# Patient Record
Sex: MALE | Race: WHITE | Employment: FULL TIME | URBAN - METROPOLITAN AREA
[De-identification: names, ages, dates, MRNs, and addresses within clinical notes are randomized per-mention and may not be internally consistent; named-entity substitution may affect disease eponyms.]

---

## 2021-08-15 ENCOUNTER — HOSPITAL ENCOUNTER (EMERGENCY)
Age: 26
Discharge: HOME | End: 2021-08-15
Attending: EMERGENCY MEDICINE
Payer: COMMERCIAL

## 2021-08-15 VITALS
WEIGHT: 140 LBS | HEART RATE: 74 BPM | OXYGEN SATURATION: 99 % | RESPIRATION RATE: 12 BRPM | HEIGHT: 69 IN | BODY MASS INDEX: 20.73 KG/M2 | DIASTOLIC BLOOD PRESSURE: 84 MMHG | SYSTOLIC BLOOD PRESSURE: 131 MMHG | TEMPERATURE: 98.2 F

## 2021-08-15 DIAGNOSIS — S81.812A LACERATION OF LEFT LOWER EXTREMITY, INITIAL ENCOUNTER: Primary | ICD-10-CM

## 2021-08-15 PROCEDURE — 99282 EMERGENCY DEPT VISIT SF MDM: CPT

## 2021-08-15 PROCEDURE — 2500000003 HC RX 250 WO HCPCS: Performed by: NURSE PRACTITIONER

## 2021-08-15 PROCEDURE — 6360000002 HC RX W HCPCS: Performed by: NURSE PRACTITIONER

## 2021-08-15 PROCEDURE — 90715 TDAP VACCINE 7 YRS/> IM: CPT | Performed by: NURSE PRACTITIONER

## 2021-08-15 PROCEDURE — 90471 IMMUNIZATION ADMIN: CPT | Performed by: NURSE PRACTITIONER

## 2021-08-15 PROCEDURE — 12002 RPR S/N/AX/GEN/TRNK2.6-7.5CM: CPT

## 2021-08-15 RX ORDER — CEPHALEXIN 500 MG/1
500 CAPSULE ORAL 2 TIMES DAILY
Qty: 10 CAPSULE | Refills: 0 | Status: SHIPPED | OUTPATIENT
Start: 2021-08-15

## 2021-08-15 RX ORDER — LIDOCAINE HYDROCHLORIDE 10 MG/ML
20 INJECTION, SOLUTION INFILTRATION; PERINEURAL ONCE
Status: COMPLETED | OUTPATIENT
Start: 2021-08-15 | End: 2021-08-15

## 2021-08-15 RX ADMIN — TETANUS TOXOID, REDUCED DIPHTHERIA TOXOID AND ACELLULAR PERTUSSIS VACCINE, ADSORBED 0.5 ML: 5; 2.5; 8; 8; 2.5 SUSPENSION INTRAMUSCULAR at 16:11

## 2021-08-15 RX ADMIN — LIDOCAINE HYDROCHLORIDE 20 ML: 10 INJECTION, SOLUTION INFILTRATION; PERINEURAL at 16:13

## 2021-08-15 ASSESSMENT — PAIN SCALES - GENERAL: PAINLEVEL_OUTOF10: 4

## 2021-08-15 NOTE — ED PROVIDER NOTES
eMERGENCY dEPARTMENT eNCOUnter   Independent Attestation     Pt Name: Glen Kraus  MRN: [id number]  [place] 1995  Date of evaluation: 8/15/21     Glen Kraus is a 22 y.o. male with CC: Laceration      Based on the medical record the care appears appropriate. I was personally available for consultation in the Emergency Department.     Berkley Alcocer DO  Attending Emergency Physician                    Reina Topete DO  08/15/21 [id number]

## 2021-08-16 ASSESSMENT — ENCOUNTER SYMPTOMS
SINUS PRESSURE: 0
ABDOMINAL PAIN: 0
COLOR CHANGE: 0
VOMITING: 0
RHINORRHEA: 0
WHEEZING: 0
SORE THROAT: 0
COUGH: 0
NAUSEA: 0
CONSTIPATION: 0
DIARRHEA: 0
SHORTNESS OF BREATH: 0

## 2021-08-16 NOTE — ED PROVIDER NOTES
wound. Negative for color change and rash. Neurological: Negative for dizziness, weakness and headaches. Hematological: Negative for adenopathy. All other systems reviewed and are negative. Except as noted above the remainder of the review of systems was reviewed and negative. PHYSICAL EXAM    (up to 7 for level 4, 8 or more for level 5)     ED Triage Vitals [08/15/21 1551]   BP Temp Temp Source Pulse Resp SpO2 Height Weight   131/84 98.2 °F (36.8 °C) Oral 74 12 99 % 5' 9\" (1.753 m) 140 lb (63.5 kg)       Physical Exam  Vitals reviewed. Constitutional:       Appearance: He is well-developed. HENT:      Head: Normocephalic and atraumatic. Eyes:      Conjunctiva/sclera: Conjunctivae normal.      Pupils: Pupils are equal, round, and reactive to light. Cardiovascular:      Rate and Rhythm: Normal rate and regular rhythm. Pulmonary:      Effort: Pulmonary effort is normal. No respiratory distress. Breath sounds: Normal breath sounds. No stridor. Abdominal:      General: Bowel sounds are normal.      Palpations: Abdomen is soft. Musculoskeletal:         General: Normal range of motion. Cervical back: Normal range of motion and neck supple. Lymphadenopathy:      Cervical: No cervical adenopathy. Skin:     General: Skin is warm and dry. Findings: No rash. Comments: 3 cm laceration noted to the left leg   Neurological:      Mental Status: He is alert and oriented to person, place, and time. LABS:  Labs Reviewed - No data to display    All other labs were within normal range or not returned as of this dictation.     EMERGENCY DEPARTMENT COURSE and DIFFERENTIAL DIAGNOSIS/MDM:   Vitals:    Vitals:    08/15/21 1551   BP: 131/84   Pulse: 74   Resp: 12   Temp: 98.2 °F (36.8 °C)   TempSrc: Oral   SpO2: 99%   Weight: 140 lb (63.5 kg)   Height: 5' 9\" (1.753 m)       Medical Decision Making: Laceration repair was performed by Juancarlos Yun the student nurse practitioner from [employer]:  Laceration Repair Procedure Note    Indication: Laceration    Procedure: The patient was placed in the appropriate position and anesthesia around the laceration was obtained by infiltration using 1% Lidocaine without epinephrine. The area was then cleansed with betadine and draped in a sterile fashion. The laceration was closed with 5-0 prolene using interrupted sutures. There were no additional lacerations requiring repair. The wound area was then dressed with a sterile dressing. Total repaired wound length: 3 cm. Other Items: Suture count: 4    The patient tolerated the procedure well. Complications: None        FINAL IMPRESSION      1.  Laceration of left lower extremity, initial encounter          DISPOSITION/PLAN   DISPOSITION Decision To Discharge 08/15/2021 04:59:28 PM      PATIENT REFERRED TO:   same day PCP  [phone]          DISCHARGE MEDICATIONS:     Discharge Medication List as of 8/15/2021  5:00 PM      START taking these medications    Details   cephALEXin (KEFLEX) 500 MG capsule Take 1 capsule by mouth 2 times daily, Disp-10 capsule, R-0Print                 (Please note that portions of this note were completed with a voice recognition program.  Efforts were made to edit the dictations but occasionally words are mis-transcribed.)    [id number] NP, APRN - CNP  Certified Nurse Practitioner          KEEGAN Smith CNP  08/16/21 [id number]